# Patient Record
Sex: MALE | Race: WHITE | NOT HISPANIC OR LATINO | Employment: PART TIME | URBAN - METROPOLITAN AREA
[De-identification: names, ages, dates, MRNs, and addresses within clinical notes are randomized per-mention and may not be internally consistent; named-entity substitution may affect disease eponyms.]

---

## 2017-02-07 ENCOUNTER — HOSPITAL ENCOUNTER (EMERGENCY)
Facility: HOSPITAL | Age: 32
Discharge: HOME | End: 2017-02-07
Attending: EMERGENCY MEDICINE | Admitting: EMERGENCY MEDICINE
Payer: COMMERCIAL

## 2017-02-07 ENCOUNTER — APPOINTMENT (EMERGENCY)
Dept: RADIOLOGY | Facility: HOSPITAL | Age: 32
End: 2017-02-07
Payer: COMMERCIAL

## 2017-02-07 VITALS
RESPIRATION RATE: 16 BRPM | SYSTOLIC BLOOD PRESSURE: 130 MMHG | TEMPERATURE: 98 F | OXYGEN SATURATION: 97 % | HEART RATE: 86 BPM | DIASTOLIC BLOOD PRESSURE: 80 MMHG

## 2017-02-07 DIAGNOSIS — S62.306A FRACTURE OF FIFTH METACARPAL BONE OF RIGHT HAND: Primary | ICD-10-CM

## 2017-02-07 PROCEDURE — 73130 X-RAY EXAM OF HAND: CPT

## 2017-02-07 PROCEDURE — 99283 EMERGENCY DEPT VISIT LOW MDM: CPT

## 2017-02-07 RX ORDER — OXYCODONE HYDROCHLORIDE AND ACETAMINOPHEN 5; 325 MG/1; MG/1
1 TABLET ORAL ONCE
Status: COMPLETED | OUTPATIENT
Start: 2017-02-07 | End: 2017-02-07

## 2017-02-07 RX ORDER — OXYCODONE HYDROCHLORIDE AND ACETAMINOPHEN 5; 325 MG/1; MG/1
1 TABLET ORAL EVERY 6 HOURS PRN
Qty: 12 TABLET | Refills: 0 | Status: SHIPPED | OUTPATIENT
Start: 2017-02-07 | End: 2017-02-17

## 2017-02-07 RX ADMIN — OXYCODONE HYDROCHLORIDE AND ACETAMINOPHEN 1 TABLET: 5; 325 TABLET ORAL at 17:38

## 2017-02-20 ENCOUNTER — ALLSCRIPTS OFFICE VISIT (OUTPATIENT)
Dept: OTHER | Facility: OTHER | Age: 32
End: 2017-02-20

## 2017-02-21 ENCOUNTER — ALLSCRIPTS OFFICE VISIT (OUTPATIENT)
Dept: OTHER | Facility: OTHER | Age: 32
End: 2017-02-21

## 2017-02-21 ENCOUNTER — APPOINTMENT (OUTPATIENT)
Dept: OCCUPATIONAL THERAPY | Facility: CLINIC | Age: 32
End: 2017-02-21
Payer: COMMERCIAL

## 2017-02-21 ENCOUNTER — HOSPITAL ENCOUNTER (OUTPATIENT)
Dept: RADIOLOGY | Facility: CLINIC | Age: 32
Discharge: HOME | End: 2017-02-21
Payer: COMMERCIAL

## 2017-02-21 DIAGNOSIS — S62.338A: ICD-10-CM

## 2017-02-21 PROCEDURE — 73130 X-RAY EXAM OF HAND: CPT

## 2017-02-21 PROCEDURE — 97760 ORTHOTIC MGMT&TRAING 1ST ENC: CPT

## 2017-03-07 ENCOUNTER — APPOINTMENT (OUTPATIENT)
Dept: OCCUPATIONAL THERAPY | Facility: CLINIC | Age: 32
End: 2017-03-07
Payer: COMMERCIAL

## 2017-03-07 ENCOUNTER — GENERIC CONVERSION - ENCOUNTER (OUTPATIENT)
Dept: OTHER | Facility: OTHER | Age: 32
End: 2017-03-07

## 2017-03-07 PROCEDURE — 97165 OT EVAL LOW COMPLEX 30 MIN: CPT

## 2017-03-13 ENCOUNTER — APPOINTMENT (OUTPATIENT)
Dept: OCCUPATIONAL THERAPY | Facility: CLINIC | Age: 32
End: 2017-03-13
Payer: COMMERCIAL

## 2017-03-15 ENCOUNTER — APPOINTMENT (OUTPATIENT)
Dept: OCCUPATIONAL THERAPY | Facility: CLINIC | Age: 32
End: 2017-03-15
Payer: COMMERCIAL

## 2017-03-16 ENCOUNTER — APPOINTMENT (OUTPATIENT)
Dept: OCCUPATIONAL THERAPY | Facility: CLINIC | Age: 32
End: 2017-03-16
Payer: COMMERCIAL

## 2017-03-20 ENCOUNTER — APPOINTMENT (OUTPATIENT)
Dept: OCCUPATIONAL THERAPY | Facility: CLINIC | Age: 32
End: 2017-03-20
Payer: COMMERCIAL

## 2017-03-20 PROCEDURE — 97110 THERAPEUTIC EXERCISES: CPT

## 2017-03-20 PROCEDURE — 97140 MANUAL THERAPY 1/> REGIONS: CPT

## 2017-03-21 ENCOUNTER — HOSPITAL ENCOUNTER (OUTPATIENT)
Dept: RADIOLOGY | Facility: CLINIC | Age: 32
Discharge: HOME | End: 2017-03-21
Payer: COMMERCIAL

## 2017-03-21 ENCOUNTER — ALLSCRIPTS OFFICE VISIT (OUTPATIENT)
Dept: OTHER | Facility: OTHER | Age: 32
End: 2017-03-21

## 2017-03-21 DIAGNOSIS — S62.338A: ICD-10-CM

## 2017-03-21 PROCEDURE — 73130 X-RAY EXAM OF HAND: CPT

## 2017-03-22 ENCOUNTER — APPOINTMENT (OUTPATIENT)
Dept: OCCUPATIONAL THERAPY | Facility: CLINIC | Age: 32
End: 2017-03-22
Payer: COMMERCIAL

## 2017-03-22 PROCEDURE — 97010 HOT OR COLD PACKS THERAPY: CPT

## 2017-03-22 PROCEDURE — 97110 THERAPEUTIC EXERCISES: CPT

## 2017-03-27 ENCOUNTER — APPOINTMENT (OUTPATIENT)
Dept: OCCUPATIONAL THERAPY | Facility: CLINIC | Age: 32
End: 2017-03-27
Payer: COMMERCIAL

## 2017-03-29 ENCOUNTER — APPOINTMENT (OUTPATIENT)
Dept: OCCUPATIONAL THERAPY | Facility: CLINIC | Age: 32
End: 2017-03-29
Payer: COMMERCIAL

## 2017-03-30 ENCOUNTER — APPOINTMENT (OUTPATIENT)
Dept: OCCUPATIONAL THERAPY | Facility: CLINIC | Age: 32
End: 2017-03-30
Payer: COMMERCIAL

## 2017-03-31 ENCOUNTER — APPOINTMENT (OUTPATIENT)
Dept: OCCUPATIONAL THERAPY | Facility: CLINIC | Age: 32
End: 2017-03-31
Payer: COMMERCIAL

## 2017-04-03 ENCOUNTER — APPOINTMENT (OUTPATIENT)
Dept: OCCUPATIONAL THERAPY | Facility: CLINIC | Age: 32
End: 2017-04-03
Payer: COMMERCIAL

## 2017-04-03 PROCEDURE — 97110 THERAPEUTIC EXERCISES: CPT

## 2017-04-03 PROCEDURE — 97140 MANUAL THERAPY 1/> REGIONS: CPT

## 2017-04-03 PROCEDURE — 97010 HOT OR COLD PACKS THERAPY: CPT

## 2017-04-07 ENCOUNTER — APPOINTMENT (OUTPATIENT)
Dept: OCCUPATIONAL THERAPY | Facility: CLINIC | Age: 32
End: 2017-04-07
Payer: COMMERCIAL

## 2017-04-07 PROCEDURE — 97110 THERAPEUTIC EXERCISES: CPT

## 2017-04-07 PROCEDURE — 97010 HOT OR COLD PACKS THERAPY: CPT

## 2017-04-10 ENCOUNTER — APPOINTMENT (OUTPATIENT)
Dept: OCCUPATIONAL THERAPY | Facility: CLINIC | Age: 32
End: 2017-04-10
Payer: COMMERCIAL

## 2017-04-12 ENCOUNTER — APPOINTMENT (OUTPATIENT)
Dept: OCCUPATIONAL THERAPY | Facility: CLINIC | Age: 32
End: 2017-04-12
Payer: COMMERCIAL

## 2017-04-13 ENCOUNTER — APPOINTMENT (OUTPATIENT)
Dept: OCCUPATIONAL THERAPY | Facility: CLINIC | Age: 32
End: 2017-04-13
Payer: COMMERCIAL

## 2018-01-10 NOTE — MISCELLANEOUS
Message  Return to work or school:   Henrique Lazaro is under my professional care  He was seen in my office on 2/20/17       No use of right hand until cleared by Physician     Tonny Shukla MD       Signatures   Electronically signed by : NITESH Conway ; Feb 20 2017  4:41PM EST                       (Author)    Electronically signed by : Josie Leblanc MD; Feb 20 2017  4:48PM EST                       (Author)

## 2018-01-12 VITALS
BODY MASS INDEX: 30.01 KG/M2 | SYSTOLIC BLOOD PRESSURE: 101 MMHG | HEIGHT: 68 IN | WEIGHT: 198 LBS | HEART RATE: 97 BPM | DIASTOLIC BLOOD PRESSURE: 74 MMHG

## 2018-01-13 VITALS
DIASTOLIC BLOOD PRESSURE: 79 MMHG | HEIGHT: 68 IN | BODY MASS INDEX: 30.01 KG/M2 | HEART RATE: 70 BPM | SYSTOLIC BLOOD PRESSURE: 120 MMHG | WEIGHT: 198 LBS

## 2018-01-14 VITALS
SYSTOLIC BLOOD PRESSURE: 119 MMHG | HEIGHT: 68 IN | BODY MASS INDEX: 30.01 KG/M2 | HEART RATE: 76 BPM | DIASTOLIC BLOOD PRESSURE: 76 MMHG | WEIGHT: 198 LBS

## 2018-01-24 NOTE — PROGRESS NOTES
Assessment    1  Closed fracture of neck of fifth metacarpal bone (815 04) ([id number])    Plan  Closed fracture of neck of fifth metacarpal bone    · 1 - Sarah Cowan MD, Laurie Yoon  (Orthopedic Surgery) Physician Referral  Consult Only: the  expectation is that the referring provider will communicate back to the patient on  treatment options  Evaluation and Treatment: the expectation is that the referred to  provider will communicate back to the patient on treatment options  Status: Hold For  - Scheduling,Retrospective Authorization  Requested for: [id number]  Care Summary provided  : Yes    Discussion/Summary    Patient presents today for evaluation of right hand injury with 5th metacarpal neck fracture of the right hand with an approximately 43 degrees angulation He was noted to have an angulation of 45 degrees on Xray and on Physical Exam he is also noted to have rotational deformity of the right small finger  Therefore he was referred to Hand Surgery- RENATO Cowan for further evaluation and consideration for surgical management  He was placed back in the splint and strongly advised not to remove the splint and not to use his right hand until cleared by Hand surgery  He will follow up with Dr Sarah Cowan  Possible side effects of new medications were reviewed with the patient/guardian today  The treatment plan was reviewed with the patient/guardian  The patient/guardian understands and agrees with the treatment plan   The patient was counseled regarding diagnostic results, instructions for management, risk factor reductions, patient and family education, impressions, importance of compliance with treatment  Chief Complaint    1  Hand Problem    History of Present Illness  Hand Pain: The patient is being seen for an initial evaluation of a hand problem affecting the right hand  The patient is right hand dominant     Current Symptoms include pain, hand  weakness, decreased pinch strength, decreased range of motion, stiffness, swelling and tendernessno numbness  Exacerbating factors include use of the hand, repetitive use and gripping  Relieving factors include immobilization and non-opioid analgesics  HPI: Patient is a 27year old male who presents today for evaluation of right hand pain after sustaining a right hand injury  He states that on Feb 7th, he had an argument with his wife and went out to his shed and on his way to grab the door he slipped and fell onto the door with his right wrist punching into the door  He had pain and swelling immediately following the fall  He went to the ED where he received Xrays which showed fracture of the fifth metacarpal neck  He was placed in a splint and referred to our services  He continues to have pain and swelling  He admits to unwrapping the splint and using his hand to work  He denied any new falls or injuries to his hand  He has been using Tylenol and Ibuprofen for pain control  He denied any prior injuries to the hand      Review of Systems    Constitutional: No fever or chills, feels well, no tiredness, no recent weight loss or weight gain  Eyes: No complaints of red eyes, no eyesight problems  ENT: no complaints of loss of hearing, no nosebleeds, no sore throat  Cardiovascular: No complaints of chest pain, no palpitations, no leg claudication or lower extremity edema  Respiratory: No complaints of shortness of breath, no wheezing, no cough  Gastrointestinal: No complaints of abdominal pain, no constipation, no nausea or vomiting, no diarrhea or bloody stools  Genitourinary: No complaints of dysuria or incontinence, no hesitancy, no nocturia  Musculoskeletal: as noted in HPI  Integumentary: No complaints of skin rash or lesion, no itching or dry skin, no skin wounds  Neurological: No complaints of headache, no confusion, no numbness or tingling, no dizziness  Psychiatric: No suicidal thoughts, no anxiety, no depression     Endocrine: No muscle weakness, no frequent urination, no excessive thirst, no feelings of weakness  ROS reviewed  Past Medical History    The active problems and past medical history were reviewed and updated today  Surgical History    The surgical history was reviewed and updated today  Family History  Family History    · No pertinent family history    The family history was reviewed and updated today  Social History    · Never a smoker   · No alcohol use   · No caffeine use  The social history was reviewed and updated today  The social history was reviewed and is unchanged  Current Meds   1  Acetaminophen 500 MG Oral Tablet; Therapy: (Recorded:20Feb2017) to Recorded    The medication list was reviewed and updated today  Vitals   Recorded: [id number] 01:32PM   Heart Rate 97   Systolic 530   Diastolic 74   Height 5 ft 8 in   Weight 198 lb    BMI Calculated 30 11   BSA Calculated 2 04     Physical Exam    Right Wrist: Appearance: Normal  Tenderness: None  ROM: Full  Motor: Normal  Special Tests: negative ulnar grind for TFCC pathology and negative Tinel's sign at the carpal tunnel  Right Fifth Digit/Hand: Appearance: ecchymosis at the 5th metacarpal, swelling at the 5th metacarpal and rotational malalignment  Tenderness: 5th MCP joint and 5th metacarpal  MCP flexion: painful restricted AROM  MCP extension: painful restricted AROM  PIP flexion: painful restricted AROM  PIP extension: painful restricted AROM  DIP flexion: painful restricted AROM  DIP extension: painful restricted AROM  Motor: Deferred     Constitutional - General appearance: Normal    Musculoskeletal - Gait and station: Normal  Muscle strength/tone: Normal    Neurologic - Sensation: Normal  Upper extremity peripheral neuro exam: Normal  Lower extremity peripheral neuro exam: Normal    Eyes   Conjunctiva and lids: Normal        Results/Data    X-ray Review Xrays of the right hand were reviewed and it shows metacarpal neck fracture with an angulation of approximately 45 degrees  Message  Return to work or school:   Rishi Gonzales is under my professional care  He was seen in my office on 2/20/17       No use of right hand until cleared by Physician  Sue Saenz MD       Attending Note  Attending Note [facility]: Attending Note: I interviewed, took the history and examined the patient, the staff discussed the patient on the day of the visit, I supervised the Resident and I agree with the Resident management plan as it was presented to me  Level of Participation: I was present in clinic and examined the patient  I agree with the Resident's note        Signatures   Electronically signed by : NITESH Huerta ; Feb 20 2017  4:41PM EST                       (Author)    Electronically signed by : Cecile Shaver MD; Feb 20 2017  4:48PM EST                       (Author)

## 2019-04-19 ENCOUNTER — HOSPITAL ENCOUNTER (EMERGENCY)
Facility: HOSPITAL | Age: 34
Discharge: HOME | End: 2019-04-19
Attending: EMERGENCY MEDICINE | Admitting: EMERGENCY MEDICINE

## 2019-04-19 ENCOUNTER — APPOINTMENT (EMERGENCY)
Dept: RADIOLOGY | Facility: HOSPITAL | Age: 34
End: 2019-04-19

## 2019-04-19 VITALS
SYSTOLIC BLOOD PRESSURE: 131 MMHG | DIASTOLIC BLOOD PRESSURE: 90 MMHG | OXYGEN SATURATION: 96 % | RESPIRATION RATE: 16 BRPM | HEART RATE: 87 BPM | TEMPERATURE: 98.8 F

## 2019-04-19 DIAGNOSIS — S90.129A CONTUSION OF TOE: Primary | ICD-10-CM

## 2019-04-19 DIAGNOSIS — S93.609A FOOT SPRAIN: ICD-10-CM

## 2019-04-19 PROCEDURE — 99283 EMERGENCY DEPT VISIT LOW MDM: CPT | Performed by: PHYSICIAN ASSISTANT

## 2019-04-19 PROCEDURE — 73630 X-RAY EXAM OF FOOT: CPT

## 2019-04-19 PROCEDURE — 99283 EMERGENCY DEPT VISIT LOW MDM: CPT

## 2019-04-19 RX ORDER — IBUPROFEN 400 MG/1
400 TABLET ORAL ONCE
Status: DISCONTINUED | OUTPATIENT
Start: 2019-04-19 | End: 2019-04-19 | Stop reason: HOSPADM

## 2019-04-19 RX ORDER — NAPROXEN 500 MG/1
500 TABLET ORAL 2 TIMES DAILY WITH MEALS
Qty: 14 TABLET | Refills: 0 | Status: SHIPPED | OUTPATIENT
Start: 2019-04-19 | End: 2019-04-26

## 2019-12-10 ENCOUNTER — HOSPITAL ENCOUNTER (EMERGENCY)
Facility: HOSPITAL | Age: 34
Discharge: HOME | End: 2019-12-10
Attending: EMERGENCY MEDICINE
Payer: COMMERCIAL

## 2019-12-10 VITALS
SYSTOLIC BLOOD PRESSURE: 104 MMHG | OXYGEN SATURATION: 97 % | WEIGHT: 175 LBS | HEART RATE: 69 BPM | TEMPERATURE: 98.1 F | BODY MASS INDEX: 26.61 KG/M2 | RESPIRATION RATE: 18 BRPM | DIASTOLIC BLOOD PRESSURE: 70 MMHG

## 2019-12-10 DIAGNOSIS — J02.9 PHARYNGITIS: Primary | ICD-10-CM

## 2019-12-10 PROCEDURE — 99283 EMERGENCY DEPT VISIT LOW MDM: CPT | Performed by: PHYSICIAN ASSISTANT

## 2019-12-10 PROCEDURE — 99282 EMERGENCY DEPT VISIT SF MDM: CPT

## 2019-12-10 RX ORDER — AMOXICILLIN 500 MG/1
500 CAPSULE ORAL EVERY 12 HOURS SCHEDULED
Qty: 20 CAPSULE | Refills: 0 | Status: SHIPPED | OUTPATIENT
Start: 2019-12-10 | End: 2019-12-20

## 2019-12-10 NOTE — DISCHARGE INSTRUCTIONS

## 2019-12-10 NOTE — ED PROVIDER NOTES
History  Chief Complaint   Patient presents with    Sore Throat     Sore throat and cough for awhile  Ruth Moeller is a 27 y o  male with past medical history of hypertension who presents to the ED with complaints of productive cough, sore throat and chills over the past day  Son at home sick with similar symptoms  Denies dysphagia, trismus, drooling, neck pain, neck stiffness, wheezing, shortness of breath, hemoptysis, chest pain, abdominal pain, nausea, vomiting, diarrhea, fever, chills  Patient is a previous smoker  History provided by:  Patient  Sore Throat   Location:  Generalized  Quality:  Sore  Severity:  Moderate  Duration:  1 day  Associated symptoms: adenopathy, cough, postnasal drip and rhinorrhea    Associated symptoms: no abdominal pain, no chest pain, no chills, no drooling, no ear discharge, no ear pain, no epistaxis, no eye discharge, no fever, no headaches, no neck stiffness, no night sweats, no plugged ear sensation, no rash, no shortness of breath, no sinus congestion, no stridor, no trouble swallowing and no voice change    Risk factors: sick contacts        Prior to Admission Medications   Prescriptions Last Dose Informant Patient Reported? Taking? ALPRAZolam (XANAX) 0 5 mg tablet   No No   Sig: Take 1 tablet by mouth 3 (three) times a day as needed for anxiety for up to 10 days   LORazepam (ATIVAN) 1 mg tablet   No No   Sig: Take 0 5 tablets by mouth every 8 (eight) hours as needed for anxiety for up to 10 days   naproxen (NAPROSYN) 500 mg tablet   No No   Sig: Take 1 tablet (500 mg total) by mouth 2 (two) times a day with meals for 14 doses      Facility-Administered Medications: None       Past Medical History:   Diagnosis Date    Anxiety     Bronchitis     Hypertension        Past Surgical History:   Procedure Laterality Date    CLAVICLE SURGERY         History reviewed  No pertinent family history    I have reviewed and agree with the history as documented  Social History     Tobacco Use    Smoking status: Former Smoker     Types: Cigarettes    Smokeless tobacco: Never Used   Substance Use Topics    Alcohol use: Yes     Comment: Social     Drug use: No        Review of Systems   Constitutional: Negative for appetite change, chills, fever, night sweats and unexpected weight change  HENT: Positive for congestion, postnasal drip, rhinorrhea and sore throat  Negative for drooling, ear discharge, ear pain, nosebleeds, trouble swallowing and voice change  Eyes: Negative for pain, discharge, redness and visual disturbance  Respiratory: Positive for cough  Negative for shortness of breath, wheezing and stridor  Cardiovascular: Negative for chest pain, palpitations and leg swelling  Gastrointestinal: Negative for abdominal pain, blood in stool, constipation, diarrhea, nausea and vomiting  Genitourinary: Negative for dysuria, flank pain, frequency, hematuria and urgency  Musculoskeletal: Negative for gait problem, joint swelling, neck pain and neck stiffness  Skin: Negative for color change and rash  Neurological: Negative for dizziness, seizures, light-headedness and headaches  Hematological: Positive for adenopathy  Physical Exam  Physical Exam   Constitutional: He is oriented to person, place, and time  He appears well-developed and well-nourished  HENT:   Head: Normocephalic and atraumatic  Nose: Nose normal    Mouth/Throat: Uvula is midline and mucous membranes are normal  Posterior oropharyngeal erythema present  Eyes: Pupils are equal, round, and reactive to light  Conjunctivae and EOM are normal    Neck: Normal range of motion  Neck supple  Cardiovascular: Normal rate, regular rhythm and intact distal pulses  Pulmonary/Chest: Effort normal and breath sounds normal    Musculoskeletal: Normal range of motion  Lymphadenopathy:     He has cervical adenopathy     Neurological: He is alert and oriented to person, place, and time  Skin: Skin is warm and dry  Capillary refill takes less than 2 seconds  Nursing note and vitals reviewed  Vital Signs  ED Triage Vitals [12/10/19 1543]   Temperature Pulse Respirations Blood Pressure SpO2   98 1 °F (36 7 °C) 69 18 104/70 97 %      Temp Source Heart Rate Source Patient Position - Orthostatic VS BP Location FiO2 (%)   Oral Monitor Sitting Right arm --      Pain Score       6           Vitals:    12/10/19 1543   BP: 104/70   Pulse: 69   Patient Position - Orthostatic VS: Sitting         Visual Acuity      ED Medications  Medications - No data to display    Diagnostic Studies  Results Reviewed     None                 No orders to display              Procedures  Procedures         ED Course  ED Course as of Dec 10 1700   Tue Dec 10, 2019   1655 Educated patient regarding diagnosis and management  Advised patient to follow up with PCP  Advised patient to RTER for persistent or worsening symptoms  MDM  Number of Diagnoses or Management Options  Pharyngitis: new and does not require workup  Diagnosis management comments: Noe Levy is a 27 y o  male with past medical history of hypertension who presents to the ED with complaints of productive cough, sore throat and chills over the past day  Son at home sick with similar symptoms  Denies dysphagia, trismus, drooling, neck pain, neck stiffness, wheezing, shortness of breath, hemoptysis, chest pain, abdominal pain, nausea, vomiting, diarrhea, fever, chills  Patient is a previous smoker  I provided patient with strict RTER precautions  I advised patient follow-up with PCP in 24-48 hours  Patient verbalized understanding          Amount and/or Complexity of Data Reviewed  Review and summarize past medical records: yes    Risk of Complications, Morbidity, and/or Mortality  Presenting problems: low  Diagnostic procedures: low  Management options: low    Patient Progress  Patient progress: improved        Disposition  Final diagnoses:   Pharyngitis     Time reflects when diagnosis was documented in both MDM as applicable and the Disposition within this note     Time User Action Codes Description Comment    12/10/2019  4:51 PM Helen Hernandez Add [J02 9] Pharyngitis       ED Disposition     ED Disposition Condition Date/Time Comment    Discharge Stable Tue Dec 10, 2019  4:51 PM Noe Levy discharge to home/self care  Follow-up Information     Follow up With Specialties Details Why Contact Info Additional [address] [facility] Emergency Department Emergency Medicine Go to  If symptoms worsen [address] [facility] [address],  [address], [place], [place], [address]    [facility] CRNA Anesthesiology Schedule an appointment as soon as possible for a visit   [address]             Patient's Medications   Discharge Prescriptions    AMOXICILLIN (AMOXIL) 500 MG CAPSULE    Take 1 capsule (500 mg total) by mouth every 12 (twelve) hours for 10 days       Start Date: 12/10/2019End Date: 12/20/2019       Order Dose: 500 mg       Quantity: 20 capsule    Refills: 0     No discharge procedures on file      ED Provider  Electronically Signed by           Zee Andrade PA-C  12/10/19 6181

## 2020-09-17 ENCOUNTER — HOSPITAL ENCOUNTER (EMERGENCY)
Facility: HOSPITAL | Age: 35
Discharge: HOME | End: 2020-09-17
Attending: EMERGENCY MEDICINE
Payer: COMMERCIAL

## 2020-09-17 ENCOUNTER — APPOINTMENT (EMERGENCY)
Dept: RADIOLOGY | Facility: HOSPITAL | Age: 35
End: 2020-09-17
Payer: COMMERCIAL

## 2020-09-17 VITALS
DIASTOLIC BLOOD PRESSURE: 44 MMHG | WEIGHT: 175 LBS | BODY MASS INDEX: 26.61 KG/M2 | RESPIRATION RATE: 18 BRPM | TEMPERATURE: 97.6 F | HEART RATE: 94 BPM | OXYGEN SATURATION: 100 % | SYSTOLIC BLOOD PRESSURE: 128 MMHG

## 2020-09-17 DIAGNOSIS — S91.319A FOOT LACERATION: Primary | ICD-10-CM

## 2020-09-17 DIAGNOSIS — M79.673 FOOT PAIN: ICD-10-CM

## 2020-09-17 PROCEDURE — 73610 X-RAY EXAM OF ANKLE: CPT

## 2020-09-17 PROCEDURE — 73630 X-RAY EXAM OF FOOT: CPT

## 2020-09-17 PROCEDURE — 99283 EMERGENCY DEPT VISIT LOW MDM: CPT

## 2020-09-17 PROCEDURE — 99284 EMERGENCY DEPT VISIT MOD MDM: CPT | Performed by: PHYSICIAN ASSISTANT

## 2020-09-17 RX ORDER — CEPHALEXIN 500 MG/1
500 CAPSULE ORAL EVERY 6 HOURS SCHEDULED
Qty: 20 CAPSULE | Refills: 0 | Status: SHIPPED | OUTPATIENT
Start: 2020-09-17 | End: 2020-09-22

## 2020-09-17 NOTE — ED PROVIDER NOTES
History  Chief Complaint   Patient presents with    Foot Injury     pt reports "jumping in back of truck two nights ago"  laceration to right foot, also c/o pain and swelling  utd on tetanus  The patient is a 37 y/o M who presents to the ED for evaluation of right foot pain  The patient states that two days ago, he was getting into his truck when he struck his right foot against something  He states he sustained a laceration at the time, which he has been keeping clean and applying antibiotic ointment  He states that he has noticed that he has had some increased swelling to his right foot and ankle  He additionally reports pain with bearing weight  He states he is unsure whether not he may have twisted his foot or ankle  He denies any significant erythema around the wound, red streaking or fever  He states he believes that he is up-to-date on his tetanus  History provided by:  Patient   used: No        Prior to Admission Medications   Prescriptions Last Dose Informant Patient Reported? Taking? ALPRAZolam (XANAX) 0 5 mg tablet   No No   Sig: Take 1 tablet by mouth 3 (three) times a day as needed for anxiety for up to 10 days   LORazepam (ATIVAN) 1 mg tablet   No No   Sig: Take 0 5 tablets by mouth every 8 (eight) hours as needed for anxiety for up to 10 days   naproxen (NAPROSYN) 500 mg tablet   No No   Sig: Take 1 tablet (500 mg total) by mouth 2 (two) times a day with meals for 14 doses      Facility-Administered Medications: None       Past Medical History:   Diagnosis Date    Anxiety     Bronchitis     Hypertension        Past Surgical History:   Procedure Laterality Date    CLAVICLE SURGERY         History reviewed  No pertinent family history  I have reviewed and agree with the history as documented      E-Cigarette/Vaping     E-Cigarette/Vaping Substances     Social History     Tobacco Use    Smoking status: Former Smoker     Types: Cigarettes    Smokeless tobacco: Never Used   Substance Use Topics    Alcohol use: Yes     Comment: Social     Drug use: No       Review of Systems   Constitutional: Negative for chills and fever  Musculoskeletal: Positive for arthralgias and myalgias  Skin: Positive for wound  Negative for color change  Rash: laceration  Hematological: Does not bruise/bleed easily  Physical Exam  Physical Exam  Constitutional:       Appearance: Normal appearance  HENT:      Head: Normocephalic and atraumatic  Nose: Nose normal       Mouth/Throat:      Mouth: Mucous membranes are dry  Pharynx: Oropharynx is clear  Eyes:      Extraocular Movements: Extraocular movements intact  Conjunctiva/sclera: Conjunctivae normal    Musculoskeletal:      Right ankle: He exhibits decreased range of motion and swelling  Right foot: Tenderness, bony tenderness and swelling present  Feet:    Skin:     General: Skin is warm and dry  Neurological:      Mental Status: He is alert and oriented to person, place, and time  Vital Signs  ED Triage Vitals [09/17/20 0958]   Temperature Pulse Respirations Blood Pressure SpO2   97 6 °F (36 4 °C) 94 18 (!) 128/44 100 %      Temp Source Heart Rate Source Patient Position - Orthostatic VS BP Location FiO2 (%)   Oral Monitor Sitting Right arm --      Pain Score       --           Vitals:    09/17/20 0958   BP: (!) 128/44   Pulse: 94   Patient Position - Orthostatic VS: Sitting         Visual Acuity      ED Medications  Medications - No data to display    Diagnostic Studies  Results Reviewed     None                 XR foot 3+ views RIGHT   ED Interpretation by Denae Granado PA-C (09/17 1123)   No acute osseous abnormality  Final Result by Bassam Bower MD (09/17 1117)      No acute osseous abnormality  Workstation performed: [id number]         XR ankle 3+ views RIGHT   ED Interpretation by Denae Granado PA-C (09/17 1123)   No acute osseous abnormality        Final Result by Meche Baig MD (09/17 1118)      No acute osseous abnormality  Workstation performed: [id number]                    Procedures  Procedures         ED Course  ED Course as of Sep 17 1522   Thu Sep 17, 2020   1009 Tdap on 5/11/2016       1151 Patient is requesting crutches  XRs negative  MDM  Number of Diagnoses or Management Options  Foot laceration: new and requires workup  Foot pain: new and requires workup  Diagnosis management comments: Patient presents for evaluation of right foot and ankle pain and swelling  The patient also has a laceration to the medial foot over the heel occurred 2 days ago  I advised the patient that we were unable to repair the laceration after 2 days due to increased risk of infection  He acknowledged understanding  He reports he is more concerned about his foot and ankle possibly being sprained  X-ray of right foot and ankle ordered  X-rays reviewed with no acute findings  While the erythema on exam with seemingly localized, due to the significant amount of erythema, I ultimately made the decision to start the patient on a course of Keflex  He is additionally requesting crutches as he states it is too painful to bear weight  I advised that he follow up with his primary care doctor if no improvement of symptoms in 2 days or return to the ED if he develops any significantly worsening symptoms or fever  He acknowledged understanding  Patient is stable for discharge         Amount and/or Complexity of Data Reviewed  Tests in the radiology section of CPT®: ordered and reviewed  Decide to obtain previous medical records or to obtain history from someone other than the patient: yes  Review and summarize past medical records: yes    Risk of Complications, Morbidity, and/or Mortality  Presenting problems: low  Diagnostic procedures: low  Management options: low    Patient Progress  Patient progress: stable      Disposition  Final diagnoses: Foot laceration   Foot pain     Time reflects when diagnosis was documented in both MDM as applicable and the Disposition within this note     Time User Action Codes Description Comment    9/17/2020 11:47 AM Johnson Child Add [I42 229G] Foot laceration     9/17/2020 11:47 AM Johnson Child Add [U76 151] Foot pain       ED Disposition     ED Disposition Condition Date/Time Comment    Discharge Stable Thu Sep 17, 2020 11:47 AM Laneta Person discharge to home/self care  Follow-up Information     Follow up With Specialties Details Why Contact Info Additional Information    Carleen Carmona CRNA Anesthesiology Schedule an appointment as soon as possible for a visit in 2 days For wound re-check [address] [address] Emergency Department Emergency Medicine  If symptoms worsen [address] [facility]  [facility] ED, [address], [place], [place], [address]          Discharge Medication List as of 9/17/2020 11:48 AM      START taking these medications    Details   cephalexin (KEFLEX) 500 mg capsule Take 1 capsule (500 mg total) by mouth every 6 (six) hours for 5 days, Starting u 9/17/2020, Until Tue 9/22/2020, Normal         CONTINUE these medications which have NOT CHANGED    Details   ALPRAZolam (XANAX) 0 5 mg tablet Take 1 tablet by mouth 3 (three) times a day as needed for anxiety for up to 10 days, Starting 12/28/2016, Until Sat 1/7/17, Print      LORazepam (ATIVAN) 1 mg tablet Take 0 5 tablets by mouth every 8 (eight) hours as needed for anxiety for up to 10 days, Starting 12/15/2016, Until Sun 12/25/16, Print      naproxen (NAPROSYN) 500 mg tablet Take 1 tablet (500 mg total) by mouth 2 (two) times a day with meals for 14 doses, Starting Fri 4/19/2019, Until Fri 4/26/2019, Normal           No discharge procedures on file      PDMP Review None          ED Provider  Electronically Signed by           Monse Mckeon PA-C  09/17/20 5655

## 2021-06-17 ENCOUNTER — HOSPITAL ENCOUNTER (EMERGENCY)
Facility: HOSPITAL | Age: 36
Discharge: HOME | End: 2021-06-17
Attending: EMERGENCY MEDICINE | Admitting: EMERGENCY MEDICINE
Payer: COMMERCIAL

## 2021-06-17 ENCOUNTER — APPOINTMENT (EMERGENCY)
Dept: CT IMAGING | Facility: HOSPITAL | Age: 36
End: 2021-06-17
Payer: COMMERCIAL

## 2021-06-17 VITALS
SYSTOLIC BLOOD PRESSURE: 120 MMHG | OXYGEN SATURATION: 98 % | HEART RATE: 75 BPM | DIASTOLIC BLOOD PRESSURE: 60 MMHG | RESPIRATION RATE: 16 BRPM | TEMPERATURE: 98.9 F | WEIGHT: 179 LBS | HEIGHT: 67 IN | BODY MASS INDEX: 28.09 KG/M2

## 2021-06-17 DIAGNOSIS — M54.50 RIGHT-SIDED LOW BACK PAIN WITHOUT SCIATICA, UNSPECIFIED CHRONICITY: Primary | ICD-10-CM

## 2021-06-17 LAB
BILIRUB UR QL STRIP: NEGATIVE
BILIRUB UR QL STRIP: NEGATIVE
CLARITY UR: CLEAR
CLARITY UR: CLEAR
COLOR UR: YELLOW
COLOR UR: YELLOW
GLUCOSE UR STRIP-MCNC: NEGATIVE MG/DL
GLUCOSE UR STRIP-MCNC: NEGATIVE MG/DL
HGB UR QL STRIP.AUTO: NEGATIVE
HGB UR QL STRIP.AUTO: NEGATIVE
KETONES UR STRIP-MCNC: NEGATIVE MG/DL
KETONES UR STRIP-MCNC: NEGATIVE MG/DL
LEUKOCYTE ESTERASE UR QL STRIP: NEGATIVE
LEUKOCYTE ESTERASE UR QL STRIP: NEGATIVE
NITRITE UR QL STRIP: NEGATIVE
NITRITE UR QL STRIP: NEGATIVE
PH UR STRIP.AUTO: 6.5 [PH]
PH UR STRIP.AUTO: 7.5 [PH] (ref 4.5–8)
PROT UR STRIP-MCNC: NEGATIVE MG/DL
PROT UR STRIP-MCNC: NEGATIVE MG/DL
SP GR UR STRIP.AUTO: 1.01 (ref 1–1.03)
SP GR UR STRIP.AUTO: 1.01 (ref 1–1.03)
UROBILINOGEN UR QL STRIP.AUTO: 0.2 E.U./DL
UROBILINOGEN UR QL STRIP.AUTO: 0.2 E.U./DL

## 2021-06-17 PROCEDURE — 81003 URINALYSIS AUTO W/O SCOPE: CPT | Performed by: EMERGENCY MEDICINE

## 2021-06-17 PROCEDURE — 81003 URINALYSIS AUTO W/O SCOPE: CPT

## 2021-06-17 PROCEDURE — 74176 CT ABD & PELVIS W/O CONTRAST: CPT

## 2021-06-17 PROCEDURE — 99282 EMERGENCY DEPT VISIT SF MDM: CPT | Performed by: EMERGENCY MEDICINE

## 2021-06-17 PROCEDURE — 99284 EMERGENCY DEPT VISIT MOD MDM: CPT

## 2021-06-17 RX ORDER — LIDOCAINE 50 MG/G
2 PATCH TOPICAL ONCE
Status: DISCONTINUED | OUTPATIENT
Start: 2021-06-17 | End: 2021-06-17 | Stop reason: HOSPADM

## 2021-06-17 RX ADMIN — LIDOCAINE 5% 2 PATCH: 700 PATCH TOPICAL at 17:47

## 2021-06-17 NOTE — DISCHARGE INSTRUCTIONS
DIAGNOSIS; LOW BACK PAIN / POSSIBLE PAIN FOR LUMBAR HERNIATED DISC      - ACTIVITY AS TOLERATED     - THE LIDOCAINE PATCHES CAN BE ON FOR 12 HRS AT A TIME- CAN NTOT GET WET- CAN USE OVER THE COUNTER LIDOCAINE PATCHES TO AREA AS NEEDED    - FOR PAIN- WOULD  TAKE BOTH OVER THE COUNTER GENERIC TYLENOL 500 MG- TOGETHER WITH OVER THE COUNTER GENERIC IBUPROFEN 400 MG 4 TIMES A DAY WITH MEALS/ LIQUIDS     - IF NO IMPROVEMENT AFTER  1-2 WEEKS- PLEASE CALL  [facility] TO SCHEDULE AN APPOINTMENT - PLACED A REFERRAL IN THE COMPUTER     - PLEASE RETURN TO  THE ER FOR ANY WORSENING/ INTRACTABLE LOW BACK PAIN ANY  FOOT/LEG WEAKNESS/ ANY INABILITY TO MOVE OR CONTROL YOUR URINE/STOOLS/ ANY NUMBNESS OF YOUR SADDLE AREA

## 2021-06-17 NOTE — Clinical Note
Abel Gong was seen and treated in our emergency department on 6/17/2021  Diagnosis:     [address]    He may return on this date: 06/18/2021    LIGHT DUTY FOR 1 WEEK  NO HEAVY LIFTING/ PENDING OVER GREATER THAN 10 LBS     If you have any questions or concerns, please don't hesitate to call        Duane Castanon MD    ______________________________           _______________          _______________  Hospital Representative                              Date                                Time

## 2021-06-23 NOTE — ED PROVIDER NOTES
History  Chief Complaint   Patient presents with    Back Pain     Patient reports yesterday started with lower pain that radiates down anetrior right leg  Patient ambulates without difficulty  38 yr male c/o 1 day of r low back/ pain  worse upon moevments--  No specific injury but does lifting at work-- states at times pain shoots into  rlq-- no n/v- no gu comps-       History provided by:  Patient   used: No    Back Pain  Associated symptoms: no dysuria        Prior to Admission Medications   Prescriptions Last Dose Informant Patient Reported? Taking? ALPRAZolam (XANAX) 0 5 mg tablet   No No   Sig: Take 1 tablet by mouth 3 (three) times a day as needed for anxiety for up to 10 days   LORazepam (ATIVAN) 1 mg tablet   No No   Sig: Take 0 5 tablets by mouth every 8 (eight) hours as needed for anxiety for up to 10 days   naproxen (NAPROSYN) 500 mg tablet   No No   Sig: Take 1 tablet (500 mg total) by mouth 2 (two) times a day with meals for 14 doses      Facility-Administered Medications: None       Past Medical History:   Diagnosis Date    Anxiety     Bronchitis     Hypertension        Past Surgical History:   Procedure Laterality Date    CLAVICLE SURGERY         History reviewed  No pertinent family history  I have reviewed and agree with the history as documented  E-Cigarette/Vaping     E-Cigarette/Vaping Substances     Social History     Tobacco Use    Smoking status: Former Smoker     Types: Cigarettes    Smokeless tobacco: Never Used   Substance Use Topics    Alcohol use: Yes     Comment: Social     Drug use: Yes     Types: Marijuana       Review of Systems   Constitutional: Negative  HENT: Negative  Eyes: Negative  Respiratory: Negative  Cardiovascular: Negative  Gastrointestinal: Negative  Endocrine: Negative  Genitourinary: Positive for flank pain   Negative for decreased urine volume, difficulty urinating, discharge, dysuria, enuresis, frequency, genital sores, hematuria, penile pain, penile swelling, scrotal swelling, testicular pain and urgency  Musculoskeletal: Positive for back pain  Negative for arthralgias, gait problem, joint swelling, myalgias, neck pain and neck stiffness  Skin: Negative  Allergic/Immunologic: Negative  Neurological: Negative  Hematological: Negative  Psychiatric/Behavioral: Negative  Physical Exam  Physical Exam  Vitals and nursing note reviewed  Constitutional:       General: He is not in acute distress  Appearance: Normal appearance  He is not ill-appearing, toxic-appearing or diaphoretic  Comments: avss-- pulse ox 98 % on ra- interpretation is normal- no intervention -    HENT:      Head: Normocephalic and atraumatic  Nose: Nose normal       Mouth/Throat:      Mouth: Mucous membranes are moist    Eyes:      General: No scleral icterus  Right eye: No discharge  Left eye: No discharge  Extraocular Movements: Extraocular movements intact  Conjunctiva/sclera: Conjunctivae normal       Pupils: Pupils are equal, round, and reactive to light  Comments: Mm pink   Neck:      Vascular: No carotid bruit  Comments: No pmt c spine   Cardiovascular:      Rate and Rhythm: Normal rate and regular rhythm  Pulses: Normal pulses  Heart sounds: No murmur heard  No friction rub  No gallop  Pulmonary:      Effort: Pulmonary effort is normal  No respiratory distress  Breath sounds: Normal breath sounds  No stridor  No wheezing, rhonchi or rales  Chest:      Chest wall: No tenderness  Abdominal:      General: Bowel sounds are normal  There is no distension  Palpations: Abdomen is soft  There is no mass  Tenderness: There is no abdominal tenderness  There is no right CVA tenderness, left CVA tenderness, guarding or rebound  Hernia: No hernia is present        Comments: Mild r mid lower flank tenderness- no cva tenderness/ no hsm - no peritoneal signs   Musculoskeletal:         General: Tenderness present  No swelling, deformity or signs of injury  Normal range of motion  Cervical back: Normal range of motion and neck supple  No rigidity or tenderness  Right lower leg: No edema  Left lower leg: No edema  Comments: Pos r paralumbar muscle tenderness- neg r slrt ad xed slrt - equal bilateral radial/dp pulses- no ble edema/calf tenderness/asym/ erythema   Lymphadenopathy:      Cervical: No cervical adenopathy  Skin:     General: Skin is warm  Capillary Refill: Capillary refill takes less than 2 seconds  Coloration: Skin is not jaundiced or pale  Findings: No bruising, erythema, lesion or rash  Neurological:      General: No focal deficit present  Mental Status: He is alert and oriented to person, place, and time  Cranial Nerves: No cranial nerve deficit  Sensory: No sensory deficit  Motor: No weakness  Coordination: Coordination normal       Gait: Gait normal       Deep Tendon Reflexes: Reflexes normal    Psychiatric:         Mood and Affect: Mood normal          Behavior: Behavior normal          Thought Content:  Thought content normal          Judgment: Judgment normal          Vital Signs  ED Triage Vitals [06/17/21 1504]   Temperature Pulse Respirations Blood Pressure SpO2   98 9 °F (37 2 °C) 75 16 120/60 98 %      Temp Source Heart Rate Source Patient Position - Orthostatic VS BP Location FiO2 (%)   Oral Monitor Lying Left arm --      Pain Score       6           Vitals:    06/17/21 1504   BP: 120/60   Pulse: 75   Patient Position - Orthostatic VS: Lying         Visual Acuity      ED Medications  Medications - No data to display    Diagnostic Studies  Results Reviewed     Procedure Component Value Units Date/Time    UA (URINE) with reflex to Scope [[id number]] Collected: 06/17/21 9130    Lab Status: Final result Specimen: Urine, Clean Catch Updated: 06/17/21 1805     Color, UA Yellow Clarity, UA Clear     Specific Gravity, UA 1 010     pH, UA 6 5     Leukocytes, UA Negative     Nitrite, UA Negative     Protein, UA Negative mg/dl      Glucose, UA Negative mg/dl      Ketones, UA Negative mg/dl      Urobilinogen, UA 0 2 E U /dl      Bilirubin, UA Negative     Blood, UA Negative    Urine Macroscopic, POC [[id number]] Collected: 06/17/21 1656    Lab Status: Final result Specimen: Urine Updated: 06/17/21 1657     Color, UA Yellow     Clarity, UA Clear     pH, UA 7 5     Leukocytes, UA Negative     Nitrite, UA Negative     Protein, UA Negative mg/dl      Glucose, UA Negative mg/dl      Ketones, UA Negative mg/dl      Urobilinogen, UA 0 2 E U /dl      Bilirubin, UA Negative     Blood, UA Negative     Specific Gravity, UA 1 015    Narrative:      CLINITEK RESULT                 CT abdomen pelvis wo contrast   Final Result by Joon Anton DO (06/17 1759)      No acute intra-abdominal abnormality  Workstation performed: [id number]                    Procedures  Procedures         ED Course  ED Course as of Jun 22 2124   Thu Jun 17, 2021   1701 ER MD NOTE- PT OFFERED PAIN MEDS REFUSES AT THIS POINT                                               MDM    Disposition  Final diagnoses:   Right-sided low back pain without sciatica, unspecified chronicity     Time reflects when diagnosis was documented in both MDM as applicable and the Disposition within this note     Time User Action Codes Description Comment    6/17/2021  6:52 PM Supa Mejia Add [M54 5] Right-sided low back pain without sciatica, unspecified chronicity       ED Disposition     ED Disposition Condition Date/Time Comment    Discharge Stable Thu Jun 17, 2021  6:52 PM Nash Mims discharge to home/self care              Follow-up Information     Follow up With Specialties Details Why Contact Info Additional [facility] Call  As needed [phone] [facility] [place]  [address] [facility] [address] [place] [facility] Heather Holstein [address] Deborrah Nyhan [address], [place], [place], [facility]          Discharge Medication List as of 6/17/2021  6:57 PM      CONTINUE these medications which have NOT CHANGED    Details   ALPRAZolam (XANAX) 0 5 mg tablet Take 1 tablet by mouth 3 (three) times a day as needed for anxiety for up to 10 days, Starting 12/28/2016, Until Sat 1/7/17, Print      LORazepam (ATIVAN) 1 mg tablet Take 0 5 tablets by mouth every 8 (eight) hours as needed for anxiety for up to 10 days, Starting 12/15/2016, Until Sun 12/25/16, Print      naproxen (NAPROSYN) 500 mg tablet Take 1 tablet (500 mg total) by mouth 2 (two) times a day with meals for 14 doses, Starting Fri 4/19/2019, Until Fri 4/26/2019, Normal               PDMP Review     None          ED Provider  Electronically Signed by           Wing Krista MD  06/22/21 4388

## 2021-07-22 ENCOUNTER — HOSPITAL ENCOUNTER (EMERGENCY)
Facility: HOSPITAL | Age: 36
Discharge: HOME | End: 2021-07-22
Attending: EMERGENCY MEDICINE | Admitting: EMERGENCY MEDICINE
Payer: COMMERCIAL

## 2021-07-22 VITALS
RESPIRATION RATE: 16 BRPM | DIASTOLIC BLOOD PRESSURE: 74 MMHG | TEMPERATURE: 97.8 F | SYSTOLIC BLOOD PRESSURE: 122 MMHG | HEART RATE: 60 BPM | OXYGEN SATURATION: 96 %

## 2021-07-22 DIAGNOSIS — M54.31 SCIATICA OF RIGHT SIDE: Primary | ICD-10-CM

## 2021-07-22 LAB
BILIRUB UR QL STRIP: NEGATIVE
BILIRUB UR QL STRIP: NEGATIVE
CLARITY UR: CLEAR
CLARITY UR: CLEAR
COLOR UR: COLORLESS
COLOR UR: YELLOW
GLUCOSE UR STRIP-MCNC: NEGATIVE MG/DL
GLUCOSE UR STRIP-MCNC: NEGATIVE MG/DL
HGB UR QL STRIP.AUTO: ABNORMAL
HGB UR QL STRIP.AUTO: NEGATIVE
KETONES UR STRIP-MCNC: NEGATIVE MG/DL
KETONES UR STRIP-MCNC: NEGATIVE MG/DL
LEUKOCYTE ESTERASE UR QL STRIP: NEGATIVE
LEUKOCYTE ESTERASE UR QL STRIP: NEGATIVE
NITRITE UR QL STRIP: NEGATIVE
NITRITE UR QL STRIP: NEGATIVE
PH UR STRIP.AUTO: 5.5 [PH] (ref 4.5–8)
PH UR STRIP.AUTO: 6 [PH]
PROT UR STRIP-MCNC: NEGATIVE MG/DL
PROT UR STRIP-MCNC: NEGATIVE MG/DL
SP GR UR STRIP.AUTO: 1.01 (ref 1–1.03)
SP GR UR STRIP.AUTO: >=1.03 (ref 1–1.03)
UROBILINOGEN UR QL STRIP.AUTO: 0.2 E.U./DL
UROBILINOGEN UR QL STRIP.AUTO: 0.2 E.U./DL

## 2021-07-22 PROCEDURE — 99283 EMERGENCY DEPT VISIT LOW MDM: CPT

## 2021-07-22 PROCEDURE — 81003 URINALYSIS AUTO W/O SCOPE: CPT | Performed by: EMERGENCY MEDICINE

## 2021-07-22 PROCEDURE — 99284 EMERGENCY DEPT VISIT MOD MDM: CPT | Performed by: EMERGENCY MEDICINE

## 2021-07-22 RX ORDER — PREDNISONE 10 MG/1
TABLET ORAL
Qty: 30 TABLET | Refills: 0 | Status: SHIPPED | OUTPATIENT
Start: 2021-07-22 | End: 2021-08-03

## 2021-07-22 RX ORDER — PREDNISONE 20 MG/1
40 TABLET ORAL ONCE
Status: COMPLETED | OUTPATIENT
Start: 2021-07-22 | End: 2021-07-22

## 2021-07-22 RX ORDER — CYCLOBENZAPRINE HCL 10 MG
10 TABLET ORAL 3 TIMES DAILY PRN
Qty: 15 TABLET | Refills: 0 | Status: SHIPPED | OUTPATIENT
Start: 2021-07-22 | End: 2021-07-27

## 2021-07-22 RX ADMIN — PREDNISONE 40 MG: 20 TABLET ORAL at 13:53

## 2021-07-23 NOTE — ED PROVIDER NOTES
History  Chief Complaint   Patient presents with    Back Pain     pt c/o worsening b/l (R>L) lower back pain since early july  occasional R leg numbness  38 yo M presents w/ right lower back pain radiating into his right upper leg to knee  States started initially 1 month ago, went to the ED twice for it  Pain coming and going, but now worsening with radiation down the leg  No changes in bowel or bladder habits, no weakness to his legs  C/o pain when he walks and weightbears on his right leg  Pain relieved when he brings his knees to his chest   No fevers/chills, no drug use  His work entails heavy lifting bending twisting  History provided by:  Patient   used: No    Back Pain      Prior to Admission Medications   Prescriptions Last Dose Informant Patient Reported? Taking? ALPRAZolam (XANAX) 0 5 mg tablet   No No   Sig: Take 1 tablet by mouth 3 (three) times a day as needed for anxiety for up to 10 days   LORazepam (ATIVAN) 1 mg tablet   No No   Sig: Take 0 5 tablets by mouth every 8 (eight) hours as needed for anxiety for up to 10 days   naproxen (NAPROSYN) 500 mg tablet   No No   Sig: Take 1 tablet (500 mg total) by mouth 2 (two) times a day with meals for 14 doses      Facility-Administered Medications: None       Past Medical History:   Diagnosis Date    Anxiety     Bronchitis     Hypertension        Past Surgical History:   Procedure Laterality Date    CLAVICLE SURGERY         No family history on file  I have reviewed and agree with the history as documented  E-Cigarette/Vaping     E-Cigarette/Vaping Substances     Social History     Tobacco Use    Smoking status: Former Smoker     Types: Cigarettes    Smokeless tobacco: Never Used   Substance Use Topics    Alcohol use: Yes     Comment: Social     Drug use: Yes     Types: Marijuana       Review of Systems   Musculoskeletal: Positive for back pain  All other systems reviewed and are negative        Physical Exam  Physical Exam  Vitals and nursing note reviewed  Constitutional:       General: He is not in acute distress  Appearance: Normal appearance  He is normal weight  HENT:      Head: Normocephalic and atraumatic  Right Ear: External ear normal       Left Ear: External ear normal       Nose: Nose normal  No congestion or rhinorrhea  Mouth/Throat:      Mouth: Mucous membranes are moist       Pharynx: Oropharynx is clear  Eyes:      General: No scleral icterus  Right eye: No discharge  Left eye: No discharge  Conjunctiva/sclera: Conjunctivae normal    Cardiovascular:      Rate and Rhythm: Normal rate and regular rhythm  Pulses: Normal pulses  Heart sounds: Normal heart sounds  Pulmonary:      Effort: Pulmonary effort is normal  No respiratory distress  Breath sounds: Normal breath sounds  Abdominal:      General: Abdomen is flat  Palpations: Abdomen is soft  Tenderness: There is no abdominal tenderness  Musculoskeletal:         General: No swelling  Normal range of motion  Cervical back: Normal range of motion and neck supple  Comments: + R lower paraspinal tenderness and spasms  No midline spinal tenderness   + b/l straight leg raise tests at approx 30 degrees elevation off stretcher  DTR's normal bilateral LE  Sensation and motor intact, 5/5  Skin:     General: Skin is warm and dry  Capillary Refill: Capillary refill takes less than 2 seconds  Neurological:      General: No focal deficit present  Mental Status: He is alert and oriented to person, place, and time  Mental status is at baseline     Psychiatric:         Mood and Affect: Mood normal          Behavior: Behavior normal          Vital Signs  ED Triage Vitals [07/22/21 1137]   Temperature Pulse Respirations Blood Pressure SpO2   97 8 °F (36 6 °C) 60 16 122/74 96 %      Temp Source Heart Rate Source Patient Position - Orthostatic VS BP Location FiO2 (%)   Tympanic Monitor Sitting Left arm --      Pain Score       --           Vitals:    07/22/21 1137   BP: 122/74   Pulse: 60   Patient Position - Orthostatic VS: Sitting         Visual Acuity      ED Medications  Medications   predniSONE tablet 40 mg (40 mg Oral Given 7/22/21 1353)       Diagnostic Studies  Results Reviewed     Procedure Component Value Units Date/Time    Urine Macroscopic, POC [[id number]]  (Abnormal) Collected: 07/22/21 1637    Lab Status: Final result Specimen: Urine Updated: 07/22/21 1638     Color, UA Yellow     Clarity, UA Clear     pH, UA 5 5     Leukocytes, UA Negative     Nitrite, UA Negative     Protein, UA Negative mg/dl      Glucose, UA Negative mg/dl      Ketones, UA Negative mg/dl      Urobilinogen, UA 0 2 E U /dl      Bilirubin, UA Negative     Blood, UA Large     Specific Gravity, UA >=1 030    Narrative:      CLINITEK RESULT    Urine Microscopic [[id number]]     Lab Status: No result Specimen: Urine     UA w Reflex to Microscopic w Reflex to Culture [[id number]] Collected: 07/22/21 1353    Lab Status: Final result Specimen: Urine, Other Updated: 07/22/21 1432     Color, UA Colorless     Clarity, UA Clear     Specific Gravity, UA 1 015     pH, UA 6 0     Leukocytes, UA Negative     Nitrite, UA Negative     Protein, UA Negative mg/dl      Glucose, UA Negative mg/dl      Ketones, UA Negative mg/dl      Urobilinogen, UA 0 2 E U /dl      Bilirubin, UA Negative     Blood, UA Negative                 No orders to display              Procedures  Procedures         ED Course                                           MDM  Number of Diagnoses or Management Options  Sciatica of right side: new and requires workup  Diagnosis management comments: 38 yo M w/ right sided back w/ pain radiaton into the leg  Exam and history suggests lumbar radiculopathy/sciatica  Treat w/ prednisone taper as this has been going on for over a month   Also muscle relaxers PRN - give f/u with PT and spine, will need outpt MRI likely if he doesn't improve  Amount and/or Complexity of Data Reviewed  Clinical lab tests: ordered and reviewed    Risk of Complications, Morbidity, and/or Mortality  Presenting problems: low  Diagnostic procedures: low  Management options: low    Patient Progress  Patient progress: stable      Disposition  Final diagnoses:   Sciatica of right side     Time reflects when diagnosis was documented in both MDM as applicable and the Disposition within this note     Time User Action Codes Description Comment    7/22/2021  2:43 PM Josué Jin Add [M54 31] Sciatica of right side       ED Disposition     ED Disposition Condition Date/Time Comment    Discharge Stable u Jul 22, 2021  2:43 PM Peg Cartwright discharge to home/self care              Follow-up Information     Follow up With Specialties Details Why Contact Info Additional Information    [facility] Physical Therapy In 3 days  [phone] [facility] Neurosurgery In 1 week  [address] [facility] [id number]  [facility], [address] [facility], [address] [place] [address], [id number] [phone]          Discharge Medication List as of 7/22/2021  2:45 PM      START taking these medications    Details   cyclobenzaprine (FLEXERIL) 10 mg tablet Take 1 tablet (10 mg total) by mouth 3 (three) times a day as needed for muscle spasms for up to 5 days, Starting Thu 7/22/2021, Until Tue 7/27/2021 at 2359, Normal      predniSONE 10 mg tablet Multiple Dosages:Starting Thu 7/22/2021, Until Sat 7/24/2021 at 2359, THEN Starting Sun 7/25/2021, Until Tue 7/27/2021 at 2359, THEN Starting Wed 7/28/2021, Until Fri 7/30/2021 at 2359, THEN Starting Sat 7/31/2021, Until Mon 8/2/2021 at 2359Take 4 t ablets (40 mg total) by mouth daily for 3 days, THEN 3 tablets (30 mg total) daily for 3 days, THEN 2 tablets (20 mg total) daily for 3 days, THEN 1 tablet (10 mg total) daily for 3 days  , Normal         CONTINUE these medications which have NOT CHANGED    Details   ALPRAZolam (XANAX) 0 5 mg tablet Take 1 tablet by mouth 3 (three) times a day as needed for anxiety for up to 10 days, Starting 12/28/2016, Until Sat 1/7/17, Print      LORazepam (ATIVAN) 1 mg tablet Take 0 5 tablets by mouth every 8 (eight) hours as needed for anxiety for up to 10 days, Starting 12/15/2016, Until Sun 12/25/16, Print      naproxen (NAPROSYN) 500 mg tablet Take 1 tablet (500 mg total) by mouth 2 (two) times a day with meals for 14 doses, Starting Fri 4/19/2019, Until Fri 4/26/2019, Normal           No discharge procedures on file      PDMP Review     None          ED Provider  Electronically Signed by           Sofi Rojas DO  07/22/21 2514